# Patient Record
Sex: MALE | URBAN - METROPOLITAN AREA
[De-identification: names, ages, dates, MRNs, and addresses within clinical notes are randomized per-mention and may not be internally consistent; named-entity substitution may affect disease eponyms.]

---

## 2022-10-11 ENCOUNTER — OFFICE VISIT (OUTPATIENT)
Dept: URBAN - METROPOLITAN AREA CLINIC 44 | Facility: CLINIC | Age: 47
End: 2022-10-11
Payer: COMMERCIAL

## 2022-10-11 DIAGNOSIS — H35.711 CENTRAL SEROUS CHORIORETINOPATHY, RIGHT EYE: Primary | ICD-10-CM

## 2022-10-11 DIAGNOSIS — H43.391 OTHER VITREOUS OPACITIES, RIGHT EYE: ICD-10-CM

## 2022-10-11 PROCEDURE — 92134 CPTRZ OPH DX IMG PST SGM RTA: CPT | Performed by: OPTOMETRIST

## 2022-10-11 PROCEDURE — 92004 COMPRE OPH EXAM NEW PT 1/>: CPT | Performed by: OPTOMETRIST

## 2022-10-11 ASSESSMENT — VISUAL ACUITY
OD: 20/30
OS: 20/20

## 2022-10-11 ASSESSMENT — KERATOMETRY
OD: 44.00
OS: 44.25

## 2022-10-11 ASSESSMENT — INTRAOCULAR PRESSURE
OD: 14
OS: 14

## 2022-10-11 NOTE — IMPRESSION/PLAN
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 Reports previous Hx 6 yrs ago. Hx of injections for allergies. Last injection about 6 weeks ago. No Hx of high stress. Plan: Discussed findings. Rec observe. RTC 4-6 weeks for DFE OD + OCT + Refraction.

## 2022-10-11 NOTE — IMPRESSION/PLAN
Impression: Other vitreous opacities, right eye: H43.391. Plan: PLAN: Discussed S/S of RD/RT. Stressed importance to RTC immediately if S/S progress.